# Patient Record
Sex: MALE | Race: ASIAN | NOT HISPANIC OR LATINO | URBAN - METROPOLITAN AREA
[De-identification: names, ages, dates, MRNs, and addresses within clinical notes are randomized per-mention and may not be internally consistent; named-entity substitution may affect disease eponyms.]

---

## 2024-01-26 ENCOUNTER — INPATIENT (INPATIENT)
Facility: HOSPITAL | Age: 84
LOS: 0 days | Discharge: HOME | End: 2024-01-27
Attending: HOSPITALIST | Admitting: HOSPITALIST
Payer: MEDICARE

## 2024-01-26 VITALS
RESPIRATION RATE: 18 BRPM | DIASTOLIC BLOOD PRESSURE: 68 MMHG | OXYGEN SATURATION: 99 % | SYSTOLIC BLOOD PRESSURE: 190 MMHG | HEART RATE: 55 BPM

## 2024-01-26 DIAGNOSIS — N18.9 CHRONIC KIDNEY DISEASE, UNSPECIFIED: ICD-10-CM

## 2024-01-26 DIAGNOSIS — N17.9 ACUTE KIDNEY FAILURE, UNSPECIFIED: ICD-10-CM

## 2024-01-26 DIAGNOSIS — I69.30 UNSPECIFIED SEQUELAE OF CEREBRAL INFARCTION: ICD-10-CM

## 2024-01-26 DIAGNOSIS — E16.2 HYPOGLYCEMIA, UNSPECIFIED: ICD-10-CM

## 2024-01-26 DIAGNOSIS — E11.9 TYPE 2 DIABETES MELLITUS WITHOUT COMPLICATIONS: ICD-10-CM

## 2024-01-26 DIAGNOSIS — Z29.9 ENCOUNTER FOR PROPHYLACTIC MEASURES, UNSPECIFIED: ICD-10-CM

## 2024-01-26 DIAGNOSIS — I10 ESSENTIAL (PRIMARY) HYPERTENSION: ICD-10-CM

## 2024-01-26 DIAGNOSIS — E78.5 HYPERLIPIDEMIA, UNSPECIFIED: ICD-10-CM

## 2024-01-26 DIAGNOSIS — D64.9 ANEMIA, UNSPECIFIED: ICD-10-CM

## 2024-01-26 LAB
ALBUMIN SERPL ELPH-MCNC: 3.1 G/DL — LOW (ref 3.3–5)
ALP SERPL-CCNC: 59 U/L — SIGNIFICANT CHANGE UP (ref 40–120)
ALT FLD-CCNC: 13 U/L — SIGNIFICANT CHANGE UP (ref 4–41)
ANION GAP SERPL CALC-SCNC: 9 MMOL/L — SIGNIFICANT CHANGE UP (ref 7–14)
APPEARANCE UR: CLEAR — SIGNIFICANT CHANGE UP
AST SERPL-CCNC: 41 U/L — HIGH (ref 4–40)
BACTERIA # UR AUTO: NEGATIVE /HPF — SIGNIFICANT CHANGE UP
BASOPHILS # BLD AUTO: 0.03 K/UL — SIGNIFICANT CHANGE UP (ref 0–0.2)
BASOPHILS NFR BLD AUTO: 0.4 % — SIGNIFICANT CHANGE UP (ref 0–2)
BILIRUB SERPL-MCNC: 0.2 MG/DL — SIGNIFICANT CHANGE UP (ref 0.2–1.2)
BILIRUB UR-MCNC: NEGATIVE — SIGNIFICANT CHANGE UP
BLOOD GAS VENOUS COMPREHENSIVE RESULT: SIGNIFICANT CHANGE UP
BUN SERPL-MCNC: 22 MG/DL — SIGNIFICANT CHANGE UP (ref 7–23)
CALCIUM SERPL-MCNC: 8.6 MG/DL — SIGNIFICANT CHANGE UP (ref 8.4–10.5)
CAST: 3 /LPF — SIGNIFICANT CHANGE UP (ref 0–4)
CHLORIDE SERPL-SCNC: 106 MMOL/L — SIGNIFICANT CHANGE UP (ref 98–107)
CO2 SERPL-SCNC: 21 MMOL/L — LOW (ref 22–31)
COLOR SPEC: YELLOW — SIGNIFICANT CHANGE UP
CREAT SERPL-MCNC: 2.49 MG/DL — HIGH (ref 0.5–1.3)
DIFF PNL FLD: NEGATIVE — SIGNIFICANT CHANGE UP
EGFR: 25 ML/MIN/1.73M2 — LOW
EOSINOPHIL # BLD AUTO: 0.15 K/UL — SIGNIFICANT CHANGE UP (ref 0–0.5)
EOSINOPHIL NFR BLD AUTO: 1.9 % — SIGNIFICANT CHANGE UP (ref 0–6)
GLUCOSE SERPL-MCNC: 142 MG/DL — HIGH (ref 70–99)
GLUCOSE UR QL: 100 MG/DL
HCT VFR BLD CALC: 30.4 % — LOW (ref 39–50)
HGB BLD-MCNC: 10.2 G/DL — LOW (ref 13–17)
IANC: 6.05 K/UL — SIGNIFICANT CHANGE UP (ref 1.8–7.4)
IMM GRANULOCYTES NFR BLD AUTO: 0.5 % — SIGNIFICANT CHANGE UP (ref 0–0.9)
KETONES UR-MCNC: NEGATIVE MG/DL — SIGNIFICANT CHANGE UP
LEUKOCYTE ESTERASE UR-ACNC: NEGATIVE — SIGNIFICANT CHANGE UP
LYMPHOCYTES # BLD AUTO: 1.12 K/UL — SIGNIFICANT CHANGE UP (ref 1–3.3)
LYMPHOCYTES # BLD AUTO: 14.4 % — SIGNIFICANT CHANGE UP (ref 13–44)
MAGNESIUM SERPL-MCNC: 1.7 MG/DL — SIGNIFICANT CHANGE UP (ref 1.6–2.6)
MCHC RBC-ENTMCNC: 30.5 PG — SIGNIFICANT CHANGE UP (ref 27–34)
MCHC RBC-ENTMCNC: 33.6 GM/DL — SIGNIFICANT CHANGE UP (ref 32–36)
MCV RBC AUTO: 91 FL — SIGNIFICANT CHANGE UP (ref 80–100)
MONOCYTES # BLD AUTO: 0.38 K/UL — SIGNIFICANT CHANGE UP (ref 0–0.9)
MONOCYTES NFR BLD AUTO: 4.9 % — SIGNIFICANT CHANGE UP (ref 2–14)
NEUTROPHILS # BLD AUTO: 6.05 K/UL — SIGNIFICANT CHANGE UP (ref 1.8–7.4)
NEUTROPHILS NFR BLD AUTO: 77.9 % — HIGH (ref 43–77)
NITRITE UR-MCNC: NEGATIVE — SIGNIFICANT CHANGE UP
NRBC # BLD: 0 /100 WBCS — SIGNIFICANT CHANGE UP (ref 0–0)
NRBC # FLD: 0 K/UL — SIGNIFICANT CHANGE UP (ref 0–0)
PH UR: 6 — SIGNIFICANT CHANGE UP (ref 5–8)
PHOSPHATE SERPL-MCNC: 3.1 MG/DL — SIGNIFICANT CHANGE UP (ref 2.5–4.5)
PLATELET # BLD AUTO: 235 K/UL — SIGNIFICANT CHANGE UP (ref 150–400)
POTASSIUM SERPL-MCNC: 5 MMOL/L — SIGNIFICANT CHANGE UP (ref 3.5–5.3)
POTASSIUM SERPL-SCNC: 5 MMOL/L — SIGNIFICANT CHANGE UP (ref 3.5–5.3)
PROT SERPL-MCNC: 7.3 G/DL — SIGNIFICANT CHANGE UP (ref 6–8.3)
PROT UR-MCNC: 300 MG/DL
RBC # BLD: 3.34 M/UL — LOW (ref 4.2–5.8)
RBC # FLD: 13.9 % — SIGNIFICANT CHANGE UP (ref 10.3–14.5)
RBC CASTS # UR COMP ASSIST: 1 /HPF — SIGNIFICANT CHANGE UP (ref 0–4)
SODIUM SERPL-SCNC: 136 MMOL/L — SIGNIFICANT CHANGE UP (ref 135–145)
SP GR SPEC: 1.01 — SIGNIFICANT CHANGE UP (ref 1–1.03)
SQUAMOUS # UR AUTO: 0 /HPF — SIGNIFICANT CHANGE UP (ref 0–5)
UROBILINOGEN FLD QL: 0.2 MG/DL — SIGNIFICANT CHANGE UP (ref 0.2–1)
WBC # BLD: 7.77 K/UL — SIGNIFICANT CHANGE UP (ref 3.8–10.5)
WBC # FLD AUTO: 7.77 K/UL — SIGNIFICANT CHANGE UP (ref 3.8–10.5)
WBC UR QL: 1 /HPF — SIGNIFICANT CHANGE UP (ref 0–5)

## 2024-01-26 PROCEDURE — 99223 1ST HOSP IP/OBS HIGH 75: CPT | Mod: GC

## 2024-01-26 PROCEDURE — 99285 EMERGENCY DEPT VISIT HI MDM: CPT

## 2024-01-26 PROCEDURE — 71045 X-RAY EXAM CHEST 1 VIEW: CPT | Mod: 26

## 2024-01-26 PROCEDURE — 76770 US EXAM ABDO BACK WALL COMP: CPT | Mod: 26

## 2024-01-26 RX ORDER — DEXTROSE 50 % IN WATER 50 %
15 SYRINGE (ML) INTRAVENOUS ONCE
Refills: 0 | Status: DISCONTINUED | OUTPATIENT
Start: 2024-01-26 | End: 2024-01-27

## 2024-01-26 RX ORDER — METOPROLOL TARTRATE 50 MG
50 TABLET ORAL
Refills: 0 | Status: DISCONTINUED | OUTPATIENT
Start: 2024-01-26 | End: 2024-01-27

## 2024-01-26 RX ORDER — SODIUM CHLORIDE 9 MG/ML
500 INJECTION INTRAMUSCULAR; INTRAVENOUS; SUBCUTANEOUS ONCE
Refills: 0 | Status: COMPLETED | OUTPATIENT
Start: 2024-01-26 | End: 2024-01-26

## 2024-01-26 RX ORDER — MONTELUKAST 4 MG/1
10 TABLET, CHEWABLE ORAL DAILY
Refills: 0 | Status: DISCONTINUED | OUTPATIENT
Start: 2024-01-26 | End: 2024-01-27

## 2024-01-26 RX ORDER — PANTOPRAZOLE SODIUM 20 MG/1
1 TABLET, DELAYED RELEASE ORAL
Refills: 0 | DISCHARGE

## 2024-01-26 RX ORDER — ASPIRIN/CALCIUM CARB/MAGNESIUM 324 MG
1 TABLET ORAL
Refills: 0 | DISCHARGE

## 2024-01-26 RX ORDER — SIMVASTATIN 20 MG/1
1 TABLET, FILM COATED ORAL
Refills: 0 | DISCHARGE

## 2024-01-26 RX ORDER — HEPARIN SODIUM 5000 [USP'U]/ML
5000 INJECTION INTRAVENOUS; SUBCUTANEOUS EVERY 8 HOURS
Refills: 0 | Status: DISCONTINUED | OUTPATIENT
Start: 2024-01-26 | End: 2024-01-27

## 2024-01-26 RX ORDER — METOPROLOL TARTRATE 50 MG
50 TABLET ORAL EVERY 8 HOURS
Refills: 0 | Status: DISCONTINUED | OUTPATIENT
Start: 2024-01-26 | End: 2024-01-26

## 2024-01-26 RX ORDER — INSULIN LISPRO 100/ML
VIAL (ML) SUBCUTANEOUS
Refills: 0 | Status: DISCONTINUED | OUTPATIENT
Start: 2024-01-26 | End: 2024-01-26

## 2024-01-26 RX ORDER — HYDRALAZINE HCL 50 MG
1 TABLET ORAL
Refills: 0 | DISCHARGE

## 2024-01-26 RX ORDER — OXYBUTYNIN CHLORIDE 5 MG
1 TABLET ORAL
Refills: 0 | DISCHARGE

## 2024-01-26 RX ORDER — METOPROLOL TARTRATE 50 MG
50 TABLET ORAL
Refills: 0 | Status: DISCONTINUED | OUTPATIENT
Start: 2024-01-26 | End: 2024-01-26

## 2024-01-26 RX ORDER — FENOFIBRATE,MICRONIZED 130 MG
48 CAPSULE ORAL DAILY
Refills: 0 | Status: DISCONTINUED | OUTPATIENT
Start: 2024-01-26 | End: 2024-01-26

## 2024-01-26 RX ORDER — AMLODIPINE BESYLATE 2.5 MG/1
5 TABLET ORAL DAILY
Refills: 0 | Status: DISCONTINUED | OUTPATIENT
Start: 2024-01-26 | End: 2024-01-26

## 2024-01-26 RX ORDER — AMLODIPINE BESYLATE 2.5 MG/1
5 TABLET ORAL DAILY
Refills: 0 | Status: DISCONTINUED | OUTPATIENT
Start: 2024-01-26 | End: 2024-01-27

## 2024-01-26 RX ORDER — DEXTROSE 50 % IN WATER 50 %
25 SYRINGE (ML) INTRAVENOUS ONCE
Refills: 0 | Status: DISCONTINUED | OUTPATIENT
Start: 2024-01-26 | End: 2024-01-27

## 2024-01-26 RX ORDER — SODIUM CHLORIDE 9 MG/ML
1000 INJECTION, SOLUTION INTRAVENOUS
Refills: 0 | Status: DISCONTINUED | OUTPATIENT
Start: 2024-01-26 | End: 2024-01-27

## 2024-01-26 RX ORDER — PANTOPRAZOLE SODIUM 20 MG/1
40 TABLET, DELAYED RELEASE ORAL
Refills: 0 | Status: DISCONTINUED | OUTPATIENT
Start: 2024-01-26 | End: 2024-01-27

## 2024-01-26 RX ORDER — ASPIRIN/CALCIUM CARB/MAGNESIUM 324 MG
81 TABLET ORAL DAILY
Refills: 0 | Status: DISCONTINUED | OUTPATIENT
Start: 2024-01-26 | End: 2024-01-27

## 2024-01-26 RX ORDER — MONTELUKAST 4 MG/1
10 TABLET, CHEWABLE ORAL DAILY
Refills: 0 | Status: DISCONTINUED | OUTPATIENT
Start: 2024-01-26 | End: 2024-01-26

## 2024-01-26 RX ORDER — FENOFIBRATE,MICRONIZED 130 MG
1 CAPSULE ORAL
Refills: 0 | DISCHARGE

## 2024-01-26 RX ORDER — HYDRALAZINE HCL 50 MG
25 TABLET ORAL
Refills: 0 | Status: DISCONTINUED | OUTPATIENT
Start: 2024-01-26 | End: 2024-01-26

## 2024-01-26 RX ORDER — OXYBUTYNIN CHLORIDE 5 MG
5 TABLET ORAL DAILY
Refills: 0 | Status: DISCONTINUED | OUTPATIENT
Start: 2024-01-26 | End: 2024-01-26

## 2024-01-26 RX ORDER — DEXTROSE 50 % IN WATER 50 %
12.5 SYRINGE (ML) INTRAVENOUS ONCE
Refills: 0 | Status: DISCONTINUED | OUTPATIENT
Start: 2024-01-26 | End: 2024-01-27

## 2024-01-26 RX ORDER — INSULIN LISPRO 100/ML
VIAL (ML) SUBCUTANEOUS AT BEDTIME
Refills: 0 | Status: DISCONTINUED | OUTPATIENT
Start: 2024-01-26 | End: 2024-01-26

## 2024-01-26 RX ORDER — GLUCAGON INJECTION, SOLUTION 0.5 MG/.1ML
1 INJECTION, SOLUTION SUBCUTANEOUS ONCE
Refills: 0 | Status: DISCONTINUED | OUTPATIENT
Start: 2024-01-26 | End: 2024-01-27

## 2024-01-26 RX ORDER — HYDRALAZINE HCL 50 MG
25 TABLET ORAL
Refills: 0 | Status: DISCONTINUED | OUTPATIENT
Start: 2024-01-26 | End: 2024-01-27

## 2024-01-26 RX ORDER — OXYBUTYNIN CHLORIDE 5 MG
5 TABLET ORAL DAILY
Refills: 0 | Status: DISCONTINUED | OUTPATIENT
Start: 2024-01-26 | End: 2024-01-27

## 2024-01-26 RX ORDER — VALSARTAN 80 MG/1
160 TABLET ORAL DAILY
Refills: 0 | Status: DISCONTINUED | OUTPATIENT
Start: 2024-01-26 | End: 2024-01-26

## 2024-01-26 RX ORDER — MONTELUKAST 4 MG/1
1 TABLET, CHEWABLE ORAL
Refills: 0 | DISCHARGE

## 2024-01-26 RX ORDER — ACETAMINOPHEN 500 MG
650 TABLET ORAL EVERY 6 HOURS
Refills: 0 | Status: DISCONTINUED | OUTPATIENT
Start: 2024-01-26 | End: 2024-01-27

## 2024-01-26 RX ORDER — ATORVASTATIN CALCIUM 80 MG/1
40 TABLET, FILM COATED ORAL AT BEDTIME
Refills: 0 | Status: DISCONTINUED | OUTPATIENT
Start: 2024-01-26 | End: 2024-01-26

## 2024-01-26 RX ORDER — METOPROLOL TARTRATE 50 MG
1 TABLET ORAL
Refills: 0 | DISCHARGE

## 2024-01-26 RX ORDER — SODIUM CHLORIDE 9 MG/ML
1000 INJECTION INTRAMUSCULAR; INTRAVENOUS; SUBCUTANEOUS ONCE
Refills: 0 | Status: DISCONTINUED | OUTPATIENT
Start: 2024-01-26 | End: 2024-01-26

## 2024-01-26 RX ADMIN — SODIUM CHLORIDE 500 MILLILITER(S): 9 INJECTION INTRAMUSCULAR; INTRAVENOUS; SUBCUTANEOUS at 11:55

## 2024-01-26 RX ADMIN — Medication 25 MILLIGRAM(S): at 17:45

## 2024-01-26 RX ADMIN — HEPARIN SODIUM 5000 UNIT(S): 5000 INJECTION INTRAVENOUS; SUBCUTANEOUS at 21:46

## 2024-01-26 RX ADMIN — SODIUM CHLORIDE 500 MILLILITER(S): 9 INJECTION INTRAMUSCULAR; INTRAVENOUS; SUBCUTANEOUS at 13:00

## 2024-01-26 RX ADMIN — AMLODIPINE BESYLATE 5 MILLIGRAM(S): 2.5 TABLET ORAL at 17:44

## 2024-01-26 NOTE — H&P ADULT - PROBLEM SELECTOR PLAN 6
- Left extremities contracted  -C/W home aspirin -Admitted for hypoglycemia  -Q4 finger sticks, low dose corrective sliding scale

## 2024-01-26 NOTE — H&P ADULT - PROBLEM SELECTOR PLAN 3
-C/w home hydralazine 25 BID, Valsartan 160, metoprolol tartrate 50 TID, amlodipine 5mg -C/w home hydralazine 25 BID, amlodipine 5mg, hold valsartan in setting of DARNELL -Creatinine elevated at 2.49  -Urine studies to evaluate cause  -Hold Valsartan  -Kidney/ bladder U/S -Creatinine elevated at 2.49  -Urine studies to evaluate cause  -Hold Valsartan  -Kidney/ bladder U/S  -Monitor I's and O's

## 2024-01-26 NOTE — ED ADULT NURSE NOTE - CHIEF COMPLAINT QUOTE
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

## 2024-01-26 NOTE — ED PROVIDER NOTE - ATTENDING CONTRIBUTION TO CARE
I performed a history and physical exam of the patient and discussed their management with the resident/fellow/ACP/student. I have reviewed the resident/fellow/ACP/student note and agree with the documented findings and plan of care, except as noted. I have personally performed a substantive portion of the visit including all aspects of the medical decision making. My medical decision making and observations are found above. Please refer to any progress notes for updates on clinical course.    83-year-old male past medical history significant for type 2 diabetes (on glimiperide/januvia), hypertension, hyperlipidemia, CVA (left-sided residual weakness upper and lower extremity), dementia presenting for hypoglycemia.  Son at bedside providing history and declined translation services.  As per son patient woke up this morning seemed mildly weaker than usual, son took fingerstick which was noted to be low and gave patient sugar water. FS at home was 35 today by family and with EMS FS 47. Reports that for past few days his sugars have been a little abnormal.  Patient received D10 with EMS.  Patient/son deny any fevers, vomiting/diarrhea, cough, rashes, dysuria, hematuria, chest pain, shortness of breath, LOC, numbness/tingling, or focal weakness.     Gen: WDWN, NAD, comfortable appearing   HEENT: No nasal discharge, mucous membranes mildly dry  CV: RRR, +S1/S2, no M/R/G, equal b/l radial pulses 2+  Resp: CTAB, no W/R/R, SPO2 >95% on RA, no increased WOB   GI: Abdomen soft non-distended, NTTP, no masses/organomegaly   MSK/Skin: No CVA tenderness, no open wounds, no bruising, no LE edema  Neuro: A&Ox2, moving all 4 extremities spontaneously   Psych: appropriate mood     MDM:   83-year-old male past medical history significant for type 2 diabetes (on glimiperide/januvia), hypertension, hyperlipidemia, CVA (left-sided residual weakness upper and lower extremity), dementia presenting for hypoglycemia, Fingerstick 211 status post D10/sugar water, asymptomatic, well-appearing, no infectious symptoms.  Exam/history concerning for but not limited to medication side effect versus poor p.o. intake. Will evaluate with basic labs, VBG, explore infectious etiology and continue to reassess blood sugar to ensure that patient does not have repeat hypoglycemia given on sulfonylurea and reassess

## 2024-01-26 NOTE — H&P ADULT - PROBLEM SELECTOR PLAN 1
-Patient with sugar in the 50s in the AM, sugars currently 142  -Hypoglycemia panel  -D/C Sulfonylurea on discharge  -q4 FS  -Low dose sliding scale -Patient with sugar in the 50s in the AM, sugars currently 142  -Hypoglycemia panel  -A1c  -D/C Sulfonylurea on discharge  -q4 FS  -Low dose sliding scale

## 2024-01-26 NOTE — ED ADULT NURSE NOTE - CAS EDN DISCHARGE INTERVENTIONS
Arm band on/IV intact Azelaic Acid Counseling: Patient counseled that medicine may cause skin irritation and to avoid applying near the eyes.  In the event of skin irritation, the patient was advised to reduce the amount of the drug applied or use it less frequently.   The patient verbalized understanding of the proper use and possible adverse effects of azelaic acid.  All of the patient's questions and concerns were addressed.

## 2024-01-26 NOTE — H&P ADULT - NSHPPHYSICALEXAM_GEN_ALL_CORE
T(C): 37.1 (01-26-24 @ 14:15), Max: 37.1 (01-26-24 @ 14:15)  HR: 63 (01-26-24 @ 14:15) (55 - 63)  BP: 175/68 (01-26-24 @ 14:15) (175/68 - 190/68)  RR: 17 (01-26-24 @ 14:15) (17 - 18)  SpO2: 100% (01-26-24 @ 14:15) (99% - 100%)    CONSTITUTIONAL: Well groomed, no apparent distress  EYES: PERRLA and symmetric, EOMI, No conjunctival or scleral injection, non-icteric  ENMT: Oral mucosa with moist membranes. Normal dentition; no pharyngeal injection or exudates             NECK: Supple, symmetric and without tracheal deviation   RESP: No respiratory distress, no use of accessory muscles; CTA b/l, no WRR  CV: RRR, +S1S2, no MRG; no JVD; no peripheral edema  GI: Soft, NT, ND, no rebound, no guarding; no palpable masses; no hepatosplenomegaly; no hernia palpated  LYMPH: No cervical LAD or tenderness; no axillary LAD or tenderness; no inguinal LAD or tenderness  MSK: Left sided upper and lower extremity weakness  SKIN: No rashes or ulcers noted; no subcutaneous nodules or induration palpable  NEURO: CN II-XII intact; normal reflexes in upper and lower extremities, sensation intact in upper and lower extremities b/l to light touch   PSYCH: Appropriate insight/judgment; A+O x 3, mood and affect appropriate, recent/remote memory intact T(C): 37.1 (01-26-24 @ 14:15), Max: 37.1 (01-26-24 @ 14:15)  HR: 63 (01-26-24 @ 14:15) (55 - 63)  BP: 175/68 (01-26-24 @ 14:15) (175/68 - 190/68)  RR: 17 (01-26-24 @ 14:15) (17 - 18)  SpO2: 100% (01-26-24 @ 14:15) (99% - 100%)    CONSTITUTIONAL: Well groomed, no apparent distress  EYES: PERRLA and symmetric, EOMI, No conjunctival or scleral injection, non-icteric  ENMT: Oral mucosa with moist membranes. Normal dentition; no pharyngeal injection or exudates             NECK: Supple, symmetric and without tracheal deviation   RESP: No respiratory distress, no use of accessory muscles; CTA b/l, no WRR  CV: RRR, +S1S2, no MRG; no JVD; no peripheral edema  GI: Soft, NT, ND, no rebound, no guarding; no palpable masses; no hepatosplenomegaly; no hernia palpated  LYMPH: No cervical LAD or tenderness; no axillary LAD or tenderness; no inguinal LAD or tenderness  MSK: Left sided upper and lower extremity contracted  SKIN: No rashes or ulcers noted; no subcutaneous nodules or induration palpable  NEURO: CN II-XII intact; normal reflexes in upper and lower extremities, sensation intact in upper and lower extremities b/l to light touch   PSYCH: AA0x1 at baseline

## 2024-01-26 NOTE — ED ADULT NURSE NOTE - OBJECTIVE STATEMENT
Patient presented to Ed with a chief complaint of hypoglycemia. As per son, he took his fathers BG this am and was in the 30's however patient was still awake and alert. As per son at times his fathers BG will become low and they administer juice. Denies fever, chills, n/v/d, recent travel or illness. As per son, patient takes oral medications but no insulin.    Patient is A/O x 3 NAD, skin intact, PERRLA, speech clear, neurologically intact with no deficits, strength b/l upper and lower extremties 5/5, sensation intact b/l upper and lower extremities, rate and rhythm regular S1 and S2 heard with no murmurs radial and pedal pulses present, capillary refill <3 , lungs clear b/l lobes throughout with no adventitious sounds or accessory muscle use, even and unlabored, abdomen soft and non-tender, bowel sounds heard x 4 quadrant, no edema noted. Safety maintained, bed in lowest position, call bell within reach, plan of care reviewed with patient , addressed all questions and concern, will continue to monitor patient.

## 2024-01-26 NOTE — H&P ADULT - HISTORY OF PRESENT ILLNESS
Patient is a 83 y.o with a PMH of T2DM, HTN, HLD, CVA affected left sided extremities and dementia presenting with hypoglycemia. Patient son at bed side, mentions that his father was feeling weak this morning, son took the patients sugar and found it to be low at xxx. Patient's son gave him sugar water. Patient is a 83 y.o with a PMH of T2DM, HTN, HLD, CVA affected left sided extremities and dementia presenting with hypoglycemia. Patient son at bed side,  son took the patients sugar and found it to be low at the 50s. Patient's son gave him sugar water, called 911 and was brought in to the ED, FS was checked in the ambulance and was found to be in the 90s after the sugar water. Patient denies all other symptoms.

## 2024-01-26 NOTE — PATIENT PROFILE ADULT - FALL HARM RISK - HARM RISK INTERVENTIONS
Assistance with ambulation/Assistance OOB with selected safe patient handling equipment/Communicate Risk of Fall with Harm to all staff/Discuss with provider need for PT consult/Monitor gait and stability/Reinforce activity limits and safety measures with patient and family/Tailored Fall Risk Interventions/Visual Cue: Yellow wristband and red socks/Bed in lowest position, wheels locked, appropriate side rails in place/Call bell, personal items and telephone in reach/Instruct patient to call for assistance before getting out of bed or chair/Non-slip footwear when patient is out of bed/[place] to call system/Physically safe environment - no spills, clutter or unnecessary equipment/Purposeful Proactive Rounding/Room/bathroom lighting operational, light cord in reach

## 2024-01-26 NOTE — ED ADULT NURSE NOTE - NSFALLHARMRISKINTERV_ED_ALL_ED

## 2024-01-26 NOTE — H&P ADULT - ATTENDING COMMENTS
83M Robert-sp with PMH of dementia (A&Ox1 at baseline), HTN, HLD, NIDDM, CVA with L-sided residual deficits p/w hypoglycemic episode to 50's. Pt a/w hypoglycemia, hypertensive urgency, and elevated creatinine.     #Hypoglycemia: Likely as patient on several antihyperglycemics with advanced CKD.   -Improved after sugar water given by son prior to arrival   -Check A1c, hold home oral diabetic agents, BG q4H with hypoglycemia protocol for now   -Will need to adjust home diabetic regimen prior to discharge, pending A1c - may require Endo eval    #Hypertensive urgency: /68 on arrival, improved to 175/68. Will restart home hydral, amlodipine, and metoprolol. Unclear why he was getting metoprolol q8H, will decreased to q12 with hold parameters. Hold losartan in setting of kidney function.    #CKD: BUN/Cr 22/2.49 on arrival. Called PCP Dr. Ashlyn Valentin at bedside, states Cr 2.2 in Oct/2023. Appears patient has not undergone workup for advanced CKD. Will obtain urine studies, kidney/bladder sono. Hold losartan. [place] I/Os. Will need outpatient Nephro follow up.    Rest of plan above as per Dr. Craft.

## 2024-01-26 NOTE — ED PROVIDER NOTE - PHYSICAL EXAMINATION
PHYSICAL EXAM:  CONSTITUTIONAL: appearing, awake, alert, oriented to person, place, not time/situation and in no apparent distress.  HEAD: Atraumatic  EYES: Clear bilaterally, pupils equal, round and reactive to light.  ENMT: Airway patent, Nasal mucosa clear. Mouth with normal mucosa. Uvula is midline.   CARDIAC: Normal rate, regular rhythm. +S1/S2. No murmurs, rubs or gallops.  RESPIRATORY: Breathing unlabored. Breath sounds clear and equal bilaterally.  ABDOMEN:  Soft, nontender, nondistended. No rebound tenderness or guarding.  NEUROLOGICAL: Alert and oriented, contracted L upper and lower ext.   SKIN: Skin warm and dry. No evidence of rashes or lesions.

## 2024-01-26 NOTE — ED PROVIDER NOTE - PROGRESS NOTE DETAILS
Shanna Gilmore MD (PGY-2 EM): discussed w. family need for admission 2/2 sulfourea hypoglycemia. rpt FS 91.  [id number]. patient currently eating. will get rpt FS in 1hr and consider octreotide if remains low. will require admission. Shanna Gilmore MD (PGY-2 EM): glu stable. will admit for monitoring/ endo consult. no need for octreotide at this time.

## 2024-01-26 NOTE — ED PROVIDER NOTE - CARE PLAN
1 Principal Discharge DX:	Hypoglycemia   Principal Discharge DX:	Hypoglycemia  Secondary Diagnosis:	Elevated serum creatinine

## 2024-01-26 NOTE — ED PROVIDER NOTE - OBJECTIVE STATEMENT
83-year-old male past medical history significant for type 2 diabetes (on metformin), hypertension, hyperlipidemia, CVA (left-sided residual weakness upper and lower extremity) presents emergency department due to hypoglycemia.  Patient is seen and evaluated at bedside with son who helps interpret and collaborates story.  As per son, patient woke up this morning, was weak, son took fingerstick which was low and gave patient sugar water.  Patient received D10 with EMS.  No systemic signs or symptoms, patient is at baseline as per son.  No acute complaints 83-year-old male past medical history significant for type 2 diabetes (on metformin), hypertension, hyperlipidemia, CVA (left-sided residual weakness upper and lower extremity), dementia presents emergency department due to hypoglycemia.  Patient is seen and evaluated at bedside with son who helps interpret and collaborates story.  As per son, patient woke up this morning, was weak, son took fingerstick which was low and gave patient sugar water.  Patient received D10 with EMS.  No systemic signs or symptoms, patient is at baseline as per son. patient lives with son, patient eat appropriately and gets fed by son. No acute complaints 83-year-old male past medical history significant for type 2 diabetes (on glimepiride, januvia ), hypertension, hyperlipidemia, CVA (left-sided residual weakness upper and lower extremity), dementia presents emergency department due to hypoglycemia.  Patient is seen and evaluated at bedside with son who helps interpret and collaborates story.  As per son, patient woke up this morning, was weak, son took fingerstick which was low and gave patient sugar water.  Patient received D10 with EMS.  No systemic signs or symptoms, patient is at baseline as per son. patient lives with son, patient eat appropriately and gets fed by son. No acute complaints

## 2024-01-26 NOTE — H&P ADULT - PROBLEM SELECTOR PLAN 4
-Atorvastatin inpatient, continue home finofibrate - Hemoglobin decreased at 10.2  -Iron studies in the AM

## 2024-01-26 NOTE — H&P ADULT - PROBLEM SELECTOR PLAN 2
-Creatinine elevated at 2.49  -Urine studies to evaluate cause -Creatinine elevated at 2.49  -Urine studies to evaluate cause  -Hold Valsartan - Elevated at 190/72s on admission  -C/w home hydralazine 25 BID, amlodipine 5mg, metoprolol 50 BID, hold valsartan in setting of DARNELL

## 2024-01-26 NOTE — H&P ADULT - NSHPLABSRESULTS_GEN_ALL_CORE
LABS:                         10.2   7.77  )-----------( 235      ( 2024 11:43 )             30.4         136  |  106  |  22  ----------------------------<  142<H>  5.0   |  21<L>  |  2.49<H>    Ca    8.6      2024 11:43  Phos  3.1       Mg     1.70         TPro  7.3  /  Alb  3.1<L>  /  TBili  0.2  /  DBili  x   /  AST  41<H>  /  ALT  13  /  AlkPhos  59        Urinalysis Basic - ( 2024 14:31 )    Color: Yellow / Appearance: Clear / S.009 / pH: x  Gluc: x / Ketone: Negative mg/dL  / Bili: Negative / Urobili: 0.2 mg/dL   Blood: x / Protein: 300 mg/dL / Nitrite: Negative   Leuk Esterase: Negative / RBC: 1 /HPF / WBC 1 /HPF   Sq Epi: x / Non Sq Epi: 0 /HPF / Bacteria: Negative /HPF            RADIOLOGY, EKG & ADDITIONAL TESTS: Reviewed.

## 2024-01-26 NOTE — H&P ADULT - PROBLEM SELECTOR PLAN 5
-Admitted for hypoglycemia  -Q4 finger sticks, low dose corrective sliding scale -Atorvastatin inpatient, continue home finofibrate

## 2024-01-26 NOTE — H&P ADULT - TIME BILLING
review of laboratory data, radiology results, consultants' recommendations, documentation in [place], discussion with patient/resident and interdisciplinary staff (such as , social workers, etc). Interventions were performed as documented above.

## 2024-01-26 NOTE — H&P ADULT - ASSESSMENT
Patient is a 83 y.o with a PMH of T2DM, HTN, HLD, CVA affected left sided extremities and dementia presenting with hypoglycemia.

## 2024-01-26 NOTE — ED ADULT TRIAGE NOTE - CHIEF COMPLAINT QUOTE
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

## 2024-01-27 VITALS
RESPIRATION RATE: 18 BRPM | HEART RATE: 65 BPM | TEMPERATURE: 98 F | SYSTOLIC BLOOD PRESSURE: 153 MMHG | OXYGEN SATURATION: 100 % | DIASTOLIC BLOOD PRESSURE: 60 MMHG

## 2024-01-27 LAB
A1C WITH ESTIMATED AVERAGE GLUCOSE RESULT: 6.4 % — HIGH (ref 4–5.6)
ALBUMIN SERPL ELPH-MCNC: 2.7 G/DL — LOW (ref 3.3–5)
ALP SERPL-CCNC: 49 U/L — SIGNIFICANT CHANGE UP (ref 40–120)
ALT FLD-CCNC: 10 U/L — SIGNIFICANT CHANGE UP (ref 4–41)
ANION GAP SERPL CALC-SCNC: 10 MMOL/L — SIGNIFICANT CHANGE UP (ref 7–14)
AST SERPL-CCNC: 23 U/L — SIGNIFICANT CHANGE UP (ref 4–40)
BILIRUB SERPL-MCNC: 0.2 MG/DL — SIGNIFICANT CHANGE UP (ref 0.2–1.2)
BUN SERPL-MCNC: 27 MG/DL — HIGH (ref 7–23)
CALCIUM SERPL-MCNC: 8.6 MG/DL — SIGNIFICANT CHANGE UP (ref 8.4–10.5)
CHLORIDE SERPL-SCNC: 110 MMOL/L — HIGH (ref 98–107)
CO2 SERPL-SCNC: 21 MMOL/L — LOW (ref 22–31)
CREAT SERPL-MCNC: 2.38 MG/DL — HIGH (ref 0.5–1.3)
CULTURE RESULTS: NO GROWTH — SIGNIFICANT CHANGE UP
EGFR: 26 ML/MIN/1.73M2 — LOW
ESTIMATED AVERAGE GLUCOSE: 137 — SIGNIFICANT CHANGE UP
FERRITIN SERPL-MCNC: 91 NG/ML — SIGNIFICANT CHANGE UP (ref 30–400)
GLUCOSE SERPL-MCNC: 125 MG/DL — HIGH (ref 70–99)
HCT VFR BLD CALC: 26.3 % — LOW (ref 39–50)
HGB BLD-MCNC: 8.8 G/DL — LOW (ref 13–17)
IRON SATN MFR SERPL: 35 % — SIGNIFICANT CHANGE UP (ref 14–50)
IRON SATN MFR SERPL: 86 UG/DL — SIGNIFICANT CHANGE UP (ref 45–165)
MAGNESIUM SERPL-MCNC: 1.6 MG/DL — SIGNIFICANT CHANGE UP (ref 1.6–2.6)
MCHC RBC-ENTMCNC: 30.7 PG — SIGNIFICANT CHANGE UP (ref 27–34)
MCHC RBC-ENTMCNC: 33.5 GM/DL — SIGNIFICANT CHANGE UP (ref 32–36)
MCV RBC AUTO: 91.6 FL — SIGNIFICANT CHANGE UP (ref 80–100)
NRBC # BLD: 0 /100 WBCS — SIGNIFICANT CHANGE UP (ref 0–0)
NRBC # FLD: 0 K/UL — SIGNIFICANT CHANGE UP (ref 0–0)
PHOSPHATE SERPL-MCNC: 2.9 MG/DL — SIGNIFICANT CHANGE UP (ref 2.5–4.5)
PLATELET # BLD AUTO: 205 K/UL — SIGNIFICANT CHANGE UP (ref 150–400)
POTASSIUM SERPL-MCNC: 4.4 MMOL/L — SIGNIFICANT CHANGE UP (ref 3.5–5.3)
POTASSIUM SERPL-SCNC: 4.4 MMOL/L — SIGNIFICANT CHANGE UP (ref 3.5–5.3)
PROT SERPL-MCNC: 6 G/DL — SIGNIFICANT CHANGE UP (ref 6–8.3)
RBC # BLD: 2.87 M/UL — LOW (ref 4.2–5.8)
RBC # FLD: 14 % — SIGNIFICANT CHANGE UP (ref 10.3–14.5)
SODIUM SERPL-SCNC: 141 MMOL/L — SIGNIFICANT CHANGE UP (ref 135–145)
SPECIMEN SOURCE: SIGNIFICANT CHANGE UP
TIBC SERPL-MCNC: 246 UG/DL — SIGNIFICANT CHANGE UP (ref 220–430)
UIBC SERPL-MCNC: 160 UG/DL — SIGNIFICANT CHANGE UP (ref 110–370)
WBC # BLD: 6.84 K/UL — SIGNIFICANT CHANGE UP (ref 3.8–10.5)
WBC # FLD AUTO: 6.84 K/UL — SIGNIFICANT CHANGE UP (ref 3.8–10.5)

## 2024-01-27 PROCEDURE — 99239 HOSP IP/OBS DSCHRG MGMT >30: CPT | Mod: GC

## 2024-01-27 RX ORDER — SITAGLIPTIN 50 MG/1
1 TABLET, FILM COATED ORAL
Qty: 30 | Refills: 0
Start: 2024-01-27

## 2024-01-27 RX ORDER — GLYBURIDE 5 MG
1 TABLET ORAL
Refills: 0 | DISCHARGE

## 2024-01-27 RX ORDER — SODIUM CHLORIDE 9 MG/ML
1000 INJECTION, SOLUTION INTRAVENOUS
Refills: 0 | Status: DISCONTINUED | OUTPATIENT
Start: 2024-01-27 | End: 2024-01-27

## 2024-01-27 RX ORDER — METFORMIN HYDROCHLORIDE 850 MG/1
1 TABLET ORAL
Refills: 0 | DISCHARGE

## 2024-01-27 RX ORDER — DEXTROSE 50 % IN WATER 50 %
15 SYRINGE (ML) INTRAVENOUS ONCE
Refills: 0 | Status: DISCONTINUED | OUTPATIENT
Start: 2024-01-27 | End: 2024-01-27

## 2024-01-27 RX ORDER — ACARBOSE 25 MG/1
1 TABLET ORAL
Refills: 0 | DISCHARGE

## 2024-01-27 RX ORDER — DEXTROSE 50 % IN WATER 50 %
12.5 SYRINGE (ML) INTRAVENOUS ONCE
Refills: 0 | Status: DISCONTINUED | OUTPATIENT
Start: 2024-01-27 | End: 2024-01-27

## 2024-01-27 RX ORDER — INSULIN LISPRO 100/ML
VIAL (ML) SUBCUTANEOUS EVERY 4 HOURS
Refills: 0 | Status: DISCONTINUED | OUTPATIENT
Start: 2024-01-27 | End: 2024-01-27

## 2024-01-27 RX ORDER — VALSARTAN 80 MG/1
1 TABLET ORAL
Refills: 0 | DISCHARGE

## 2024-01-27 RX ORDER — DEXTROSE 50 % IN WATER 50 %
25 SYRINGE (ML) INTRAVENOUS ONCE
Refills: 0 | Status: DISCONTINUED | OUTPATIENT
Start: 2024-01-27 | End: 2024-01-27

## 2024-01-27 RX ORDER — SITAGLIPTIN 50 MG/1
1 TABLET, FILM COATED ORAL
Refills: 0 | DISCHARGE

## 2024-01-27 RX ORDER — GLUCAGON INJECTION, SOLUTION 0.5 MG/.1ML
1 INJECTION, SOLUTION SUBCUTANEOUS ONCE
Refills: 0 | Status: DISCONTINUED | OUTPATIENT
Start: 2024-01-27 | End: 2024-01-27

## 2024-01-27 RX ORDER — AMLODIPINE BESYLATE 2.5 MG/1
1 TABLET ORAL
Qty: 30 | Refills: 0
Start: 2024-01-27 | End: 2024-02-25

## 2024-01-27 RX ORDER — AMLODIPINE BESYLATE 2.5 MG/1
2 TABLET ORAL
Qty: 0 | Refills: 0 | DISCHARGE

## 2024-01-27 RX ADMIN — AMLODIPINE BESYLATE 5 MILLIGRAM(S): 2.5 TABLET ORAL at 05:51

## 2024-01-27 RX ADMIN — Medication 25 MILLIGRAM(S): at 05:51

## 2024-01-27 RX ADMIN — HEPARIN SODIUM 5000 UNIT(S): 5000 INJECTION INTRAVENOUS; SUBCUTANEOUS at 05:52

## 2024-01-27 RX ADMIN — HEPARIN SODIUM 5000 UNIT(S): 5000 INJECTION INTRAVENOUS; SUBCUTANEOUS at 14:32

## 2024-01-27 RX ADMIN — Medication 50 MILLIGRAM(S): at 05:52

## 2024-01-27 RX ADMIN — PANTOPRAZOLE SODIUM 40 MILLIGRAM(S): 20 TABLET, DELAYED RELEASE ORAL at 05:51

## 2024-01-27 RX ADMIN — Medication 81 MILLIGRAM(S): at 11:46

## 2024-01-27 RX ADMIN — MONTELUKAST 10 MILLIGRAM(S): 4 TABLET, CHEWABLE ORAL at 11:45

## 2024-01-27 RX ADMIN — Medication 5 MILLIGRAM(S): at 11:45

## 2024-01-27 RX ADMIN — Medication 2: at 13:18

## 2024-01-27 NOTE — DISCHARGE NOTE PROVIDER - HOSPITAL COURSE
Patient is a 83 y.o with a PMH of T2DM, HTN, HLD, CVA affected left sided extremities and dementia presenting with hypoglycemia. Patient son at bed side,  son took the patients sugar and found it to be low at the 50s. Patient's son gave him sugar water, called 911 and was brought in to the ED, FS was checked in the ambulance and was found to be in the 90s after the sugar water. Patient denies all other symptoms.    Patient was given a normal diet while inpatient and had his sugars checked every four hours. Sugars returned to a stable range and patient was ready for discharge. Work up for cause of hypoglycemia was done, it was determined that likely 2/2 sulfonylurea medication, was discontinued on discharge Patient is a 83 y.o with a PMH of T2DM, HTN, HLD, CVA affected left sided extremities and dementia presenting with hypoglycemia. Patient son at bed side,  son took the patients sugar and found it to be low at the 50s. Patient's son gave him sugar water, called 911 and was brought in to the ED, FS was checked in the ambulance and was found to be in the 90s after the sugar water. Patient denies all other symptoms.    Patient was given a normal diet while inpatient and had his sugars checked every four hours. Sugars returned to a stable range and patient was ready for discharge. Work up for cause of hypoglycemia was done, it was determined that likely 2/2 sulfonylurea medication, was discontinued on discharge . Patient was also noted to have elevated creatinine, imaging showed hyperechogenicity of the kidneys, indicated kidney disease. Medications such as valsartan, acarbose, and metformin were discontinued and patient was stable for discharge.

## 2024-01-27 NOTE — PROGRESS NOTE ADULT - PROBLEM SELECTOR PLAN 3
-Creatinine elevated at 2.49  -Proteinuria noted on studies   -Hold Valsartan  -Kidney/ bladder U/S shows echogenicity   -Monitor I's and O's

## 2024-01-27 NOTE — PROGRESS NOTE ADULT - PROBLEM SELECTOR PLAN 2
- Elevated at 190/72s on admission  -C/w home hydralazine 25 BID, amlodipine 5mg, metoprolol 50 BID, hold valsartan in setting of DARNELL

## 2024-01-27 NOTE — DISCHARGE NOTE PROVIDER - NSDCCPCAREPLAN_GEN_ALL_CORE_FT
PRINCIPAL DISCHARGE DIAGNOSIS  Diagnosis: Hypoglycemia  Assessment and Plan of Treatment: Your son checks your sugars every morning, on the morning of 1/26 your sugars were noted to be low in the 50s, you were found to be hypoglycemic which means you had low blood sugar. While you were in the hospital you were given sugars and fed, with close monitoring of your sugar levels. We determined that the likely cause of this is one of your medications, the sulfoynurea, Glyburide and thus discontinued the medication on your discharge.     PRINCIPAL DISCHARGE DIAGNOSIS  Diagnosis: Hypoglycemia  Assessment and Plan of Treatment: Your son checks your sugars every morning, on the morning of 1/26 your sugars were noted to be low in the 50s, you were found to be hypoglycemic which means you had low blood sugar. While you were in the hospital you were given sugars and fed, with close monitoring of your sugar levels. We determined that the likely cause of this is one of your medications, the sulfoynurea, Glyburide and thus discontinued the medication on your discharge. We also discontinued the metformin and acarbose as they have negative effect on your kidneys, further discussed in the chronic kidney disease section.  If you ever experience new weakness or dizinzess and have low blood sugars again, please return to your nearest ED.      SECONDARY DISCHARGE DIAGNOSES  Diagnosis: Chronic kidney disease, unspecified CKD stage  Assessment and Plan of Treatment: You were noted to have elevated creatinine, a marker used to evaluate kidney function. We spoke to your PCP and this is not a new occurence for you, making this problem chronic. Because of this we discontinued one blood pressure medication, valsartan, and upped the amlodipine dose to 10mg. We also discontinued the metformin and acarbose as they have negative effects on the kidney as well.

## 2024-01-27 NOTE — DISCHARGE NOTE PROVIDER - NSDCMRMEDTOKEN_GEN_ALL_CORE_FT
acarbose 50 mg oral tablet: 1 tab(s) orally 3 times a day  amLODIPine 5 mg oral tablet: 1 tab(s) orally once a day  aspirin 81 mg oral tablet: 1 tab(s) orally once a day  fenofibrate 48 mg oral tablet: 1 tab(s) orally once a day  GlyBURIDE (Eqv-DiaBeta) 5 mg oral tablet: 1 tab(s) orally once a day  hydrALAZINE 25 mg oral tablet: 1 tab(s) orally 2 times a day  Januvia 100 mg oral tablet: 1 tab(s) orally once a day  metFORMIN 500 mg oral tablet: 1 tab(s) orally 2 times a day  Metoprolol Tartrate 50 mg oral tablet: 1 tab(s) orally every 8 hours  montelukast 10 mg oral tablet: 1 tab(s) orally once a day  oxyBUTYnin 5 mg oral tablet: 1 tab(s) orally once a day  Protonix 40 mg oral delayed release tablet: 1 tab(s) orally once a day  simvastatin 5 mg oral tablet: 1 tab(s) orally once a day  valsartan 160 mg oral capsule: 1 cap(s) orally once a day   amLODIPine 5 mg oral tablet: 2 tab(s) orally once a day  aspirin 81 mg oral tablet: 1 tab(s) orally once a day  fenofibrate 48 mg oral tablet: 1 tab(s) orally once a day  hydrALAZINE 25 mg oral tablet: 1 tab(s) orally 2 times a day  Januvia 100 mg oral tablet: 1 tab(s) orally once a day  Metoprolol Tartrate 50 mg oral tablet: 1 tab(s) orally every 8 hours  montelukast 10 mg oral tablet: 1 tab(s) orally once a day  oxyBUTYnin 5 mg oral tablet: 1 tab(s) orally once a day  Protonix 40 mg oral delayed release tablet: 1 tab(s) orally once a day  simvastatin 5 mg oral tablet: 1 tab(s) orally once a day   amLODIPine 5 mg oral tablet: 2 tab(s) orally once a day  aspirin 81 mg oral tablet: 1 tab(s) orally once a day  fenofibrate 48 mg oral tablet: 1 tab(s) orally once a day  hydrALAZINE 25 mg oral tablet: 1 tab(s) orally 2 times a day  Metoprolol Tartrate 50 mg oral tablet: 1 tab(s) orally every 8 hours  montelukast 10 mg oral tablet: 1 tab(s) orally once a day  oxyBUTYnin 5 mg oral tablet: 1 tab(s) orally once a day  Protonix 40 mg oral delayed release tablet: 1 tab(s) orally once a day  simvastatin 5 mg oral tablet: 1 tab(s) orally once a day   amLODIPine 10 mg oral tablet: 1 tab(s) orally once a day  aspirin 81 mg oral tablet: 1 tab(s) orally once a day  fenofibrate 48 mg oral tablet: 1 tab(s) orally once a day  hydrALAZINE 25 mg oral tablet: 1 tab(s) orally 2 times a day  Januvia 25 mg oral tablet: 1 tab(s) orally once a day  Metoprolol Tartrate 50 mg oral tablet: 1 tab(s) orally every 8 hours  montelukast 10 mg oral tablet: 1 tab(s) orally once a day  oxyBUTYnin 5 mg oral tablet: 1 tab(s) orally once a day  Protonix 40 mg oral delayed release tablet: 1 tab(s) orally once a day  simvastatin 5 mg oral tablet: 1 tab(s) orally once a day

## 2024-01-27 NOTE — DISCHARGE NOTE NURSING/CASE MANAGEMENT/SOCIAL WORK - PATIENT PORTAL LINK FT
You can access the FollowMyHealth Patient Portal offered by [facility] by registering at the following website: [email or web address]. By joining [facility]’s FollowMyHealth portal, you will also be able to view your health information using other applications (apps) compatible with our system.

## 2024-01-27 NOTE — PROGRESS NOTE ADULT - ATTENDING COMMENTS
83M Robert-sp with PMH of dementia (A&Ox1 at baseline), HTN, HLD, NIDDM, CVA with L-sided residual deficits p/w hypoglycemic episode to 50's. Pt a/w hypoglycemia, hypertensive urgency, and elevated creatinine.     #Hypoglycemia: Likely as patient on several antihyperglycemics with advanced CKD.   -Improved, now resolved after holding home oral diabetic agents  -A1c 6.4, goal for age <8.0, can liberalize to avoid hypoglycemia  -discontinue home sulfonylurea as this can cause hypoglycemia episodes, discontinue home metformin as GFR<30, discontinue home acarbose as not recommended for creat >2.0  -This changes discussed with son at bedside  -can discharge on januvia or farxiga, with follow up with PCP    #Hypertensive urgency: /68 on arrival, improved to 161/60. On home hydral, amlodipine, and metoprolol. losartan held in setting of kidney function. Increase home amlodipine to 10mg and continue to hold ARB on discharge.     #CKD: BUN/Cr 22/2.49 on arrival. Called PCP Dr. Ashlyn Valentin at bedside, states Cr 2.2 in Oct/2023. Kidney US without hydro but renal disease. Creat now down to 2.3. Hold losartan. Will need outpatient Nephro follow up, discussed with Son at bedside    Rest of plan above as per Dr. Craft. [place] home today. 83M Robert-sp with PMH of dementia (A&Ox1 at baseline), HTN, HLD, NIDDM, CVA with L-sided residual deficits p/w hypoglycemic episode to 50's. Pt a/w hypoglycemia, hypertensive urgency, and elevated creatinine.     #Hypoglycemia: Likely as patient on several antihyperglycemics with advanced CKD.   -Improved, now resolved after holding home oral diabetic agents  -A1c 6.4, goal for age <8.0, can liberalize to avoid hypoglycemia  -discontinue home sulfonylurea as this can cause hypoglycemia episodes, discontinue home metformin as GFR<30, discontinue home acarbose as not recommended for creat >2.0  -This changes discussed with son at bedside  -can discharge on januvia, follow up with PCP    #Hypertensive urgency: /68 on arrival, improved to 161/60. On home hydral, amlodipine, and metoprolol. losartan held in setting of kidney function. Increase home amlodipine to 10mg and continue to hold ARB on discharge.     #CKD: BUN/Cr 22/2.49 on arrival. Called PCP Dr. Ashlyn Valentin at bedside, states Cr 2.2 in Oct/2023. Kidney US without hydro but renal disease. Creat now down to 2.3. Hold losartan. Will need outpatient Nephro follow up, discussed with Son at bedside    Rest of plan above as per Dr. Craft. Dc home today.

## 2024-01-27 NOTE — DISCHARGE NOTE PROVIDER - CARE PROVIDER_API CALL
Ashlyn Valentin  Internal Medicine  [address] [place] [address]  Phone: [phone]  Fax: [phone]  Follow Up Time: 1 week

## 2024-01-27 NOTE — PROGRESS NOTE ADULT - PROBLEM SELECTOR PLAN 1
-Patient with sugar in the 50s in the AM of admission, sugars currently 142  -Hypoglycemia panel  -A1c  -D/C Sulfonylurea on discharge  -q4 FS  -

## 2024-01-27 NOTE — DISCHARGE NOTE NURSING/CASE MANAGEMENT/SOCIAL WORK - NSDCPEFALRISK_GEN_ALL_CORE
For information on Fall & Injury Prevention, visit: [email or web address] OR  [email or web address] OR  https://www.cdc.gov/steadi/patient.html

## 2024-01-27 NOTE — PROGRESS NOTE ADULT - SUBJECTIVE AND OBJECTIVE BOX
PROGRESS NOTE:    Daniel Craft MD  Internal Medicine PGY-1  Available on [employer] Teams      Patient is a 83y old  Male who presents with a chief complaint of Hypoglycemia (2024 15:32)      SUBJECTIVE / OVERNIGHT EVENTS: No acute overnight events.      MEDICATIONS  (STANDING):  amLODIPine   Tablet 5 milliGRAM(s) Oral daily  aspirin  chewable 81 milliGRAM(s) Oral daily  dextrose 5%. 1000 milliLiter(s) (100 mL/Hr) IV Continuous <Continuous>  dextrose 5%. 1000 milliLiter(s) (50 mL/Hr) IV Continuous <Continuous>  dextrose 50% Injectable 25 Gram(s) IV Push once  dextrose 50% Injectable 25 Gram(s) IV Push once  dextrose 50% Injectable 12.5 Gram(s) IV Push once  glucagon  Injectable 1 milliGRAM(s) IntraMuscular once  heparin   Injectable 5000 Unit(s) SubCutaneous every 8 hours  hydrALAZINE 25 milliGRAM(s) Oral two times a day  metoprolol tartrate 50 milliGRAM(s) Oral two times a day  montelukast 10 milliGRAM(s) Oral daily  oxybutynin 5 milliGRAM(s) Oral daily  pantoprazole    Tablet 40 milliGRAM(s) Oral before breakfast    MEDICATIONS  (PRN):  acetaminophen     Tablet .. 650 milliGRAM(s) Oral every 6 hours PRN Temp greater or equal to 38C (100.4F), Mild Pain (1 - 3)  dextrose Oral Gel 15 Gram(s) Oral once PRN Blood Glucose LESS THAN 70 milliGRAM(s)/deciliter      I&O's Summary      Vital Signs Last 24 Hrs  T(C): 36.4 (2024 05:34), Max: 37.1 (2024 14:15)  T(F): 97.5 (2024 05:34), Max: 98.8 (2024 14:15)  HR: 67 (2024 05:34) (55 - 67)  BP: 161/60 (2024 05:34) (161/60 - 190/68)  BP(mean): --  RR: 17 (2024 05:34) (17 - 18)  SpO2: 100% (2024 05:34) (99% - 100%)    Parameters below as of 2024 05:34  Patient On (Oxygen Delivery Method): room air        =================PHYSICAL EXAM=================    CONSTITUTIONAL: Well groomed, no apparent distress  EYES: PERRLA and symmetric, EOMI, No conjunctival or scleral injection, non-icteric  ENMT: Oral mucosa with moist membranes. Normal dentition; no pharyngeal injection or exudates             NECK: Supple, symmetric and without tracheal deviation   RESP: No respiratory distress, no use of accessory muscles; CTA b/l, no WRR  CV: RRR, +S1S2, no MRG; no JVD; no peripheral edema  GI: Soft, NT, ND, no rebound, no guarding; no palpable masses; no hepatosplenomegaly; no hernia palpated  LYMPH: No cervical LAD or tenderness; no axillary LAD or tenderness; no inguinal LAD or tenderness  MSK: Left sided upper and lower extremity contracted  SKIN: No rashes or ulcers noted; no subcutaneous nodules or induration palpable  NEURO: CN II-XII intact; normal reflexes in upper and lower extremities, sensation intact in upper and lower extremities b/l to light touch   PSYCH: AA0x1 at baseline    =================================================    LABS:                        8.8    6.84  )-----------( 205      ( 2024 06:40 )             26.3     Auto Eosinophil # x     / Auto Eosinophil % x     / Auto Neutrophil # x     / Auto Neutrophil % x     / BANDS % x                            10.2   7.77  )-----------( 235      ( 2024 11:43 )             30.4     Auto Eosinophil # 0.15  / Auto Eosinophil % 1.9   / Auto Neutrophil # 6.05  / Auto Neutrophil % 77.9  / BANDS % x            136  |  106  |  22  ----------------------------<  142<H>  5.0   |  21<L>  |  2.49<H>    Ca    8.6      2024 11:43  Mg     1.70       Phos  3.1       TPro  7.3  /  Alb  3.1<L>  /  TBili  0.2  /  DBili  x   /  AST  41<H>  /  ALT  13  /  AlkPhos  59            Urinalysis Basic - ( 2024 14:31 )    Color: Yellow / Appearance: Clear / S.009 / pH: x  Gluc: x / Ketone: Negative mg/dL  / Bili: Negative / Urobili: 0.2 mg/dL   Blood: x / Protein: 300 mg/dL / Nitrite: Negative   Leuk Esterase: Negative / RBC: 1 /HPF / WBC 1 /HPF   Sq Epi: x / Non Sq Epi: 0 /HPF / Bacteria: Negative /HPF            RADIOLOGY & ADDITIONAL TESTS:    Imaging Personally Reviewed:    Consultant(s) Notes Reviewed:      Care Discussed with Consultants/Other Providers: